# Patient Record
Sex: FEMALE | Race: WHITE | NOT HISPANIC OR LATINO | Employment: FULL TIME | URBAN - METROPOLITAN AREA
[De-identification: names, ages, dates, MRNs, and addresses within clinical notes are randomized per-mention and may not be internally consistent; named-entity substitution may affect disease eponyms.]

---

## 2023-03-01 ENCOUNTER — TELEPHONE (OUTPATIENT)
Dept: SURGERY | Facility: CLINIC | Age: 48
End: 2023-03-01
Payer: COMMERCIAL

## 2023-03-01 NOTE — TELEPHONE ENCOUNTER
Pt called back to speak about getting an appt with Dr. Zhang.  RN let pt know that an AV is scheduled for 3/21 at 1:00pm if she is available.  Pt confirmed and verbalized understanding to all. Pt was also told to work on getting her records together for review.  Pt stated that she already has copies of some at home.

## 2023-03-01 NOTE — TELEPHONE ENCOUNTER
Left pt a message on 3/1 at 11am with office number to give us a call back regarding BCIR procedure.  Pt did not have a MRN at this time.

## 2023-03-01 NOTE — TELEPHONE ENCOUNTER
Pt has a MRN now.  Called her back regarding her message. Left her a vmail with office number again.  Tentatively scheduled her an AV with Dr. Zhang after speaking with him.  Will wait until I speak with pt to confirm.

## 2023-03-21 ENCOUNTER — OFFICE VISIT (OUTPATIENT)
Dept: SURGERY | Facility: CLINIC | Age: 48
End: 2023-03-21
Payer: MEDICARE

## 2023-03-21 DIAGNOSIS — K91.850 CHRONIC ANTIBIOTIC-DEPENDENT ILEAL POUCHITIS: Primary | ICD-10-CM

## 2023-03-21 PROCEDURE — 99202 PR OFFICE/OUTPT VISIT, NEW, LEVL II, 15-29 MIN: ICD-10-PCS | Mod: 95,,, | Performed by: STUDENT IN AN ORGANIZED HEALTH CARE EDUCATION/TRAINING PROGRAM

## 2023-03-21 PROCEDURE — 99202 OFFICE O/P NEW SF 15 MIN: CPT | Mod: 95,,, | Performed by: STUDENT IN AN ORGANIZED HEALTH CARE EDUCATION/TRAINING PROGRAM

## 2023-03-21 NOTE — PROGRESS NOTES
Established Patient - Audio Only Telehealth Visit     The patient location is: [place]  The chief complaint leading to consultation is: Recurrent pouchitis  Visit type: Virtual visit with audio only (telephone)  Total time spent with patient: 15 min     The reason for the audio only service rather than synchronous audio and video virtual visit was related to technical difficulties or patient preference/necessity.     Each patient to whom I provide medical services by telemedicine is:  (1) informed of the relationship between the physician and patient and the respective role of any other health care provider with respect to management of the patient; and (2) notified that they may decline to receive medical services by telemedicine and may withdraw from such care at any time. Patient verbally consented to receive this service via voice-only telephone call.     HPI: Ms. Dumont is 47 years old underwent eventual BCIR in approximately 2015 - no revisions. Most significant issue is recurrent pouchitis - manifested as liquid stool, bleeding from intubations too frequently, and abdominal pain. She is on near constant Ciprofloxacin 500 mg BID - she was recently started on Budesonide (about 6 months) without significant benefit and so was started back on Ciprofloxacin recently. Ciprofloxacin seems to work best, has tried Flagyl. She has tried Probiotics, no significant improvement noted. She did have a pouchoscopy by her Gastroenterologist about 1 year ago - reportedly normal. She denies any significant incontinence, or difficulty intubating generally. She denies any fevers or chills normally some mild symptoms with pouchitis. She doesn't take NSAIDs frequently.    Past Medical History:   Diagnosis Date    Acute kidney injury     HTN (hypertension)     Neuropathy     Ulcerative colitis      Past Surgical History:   Procedure Laterality Date    CREATION OF KOCK POUCH CONTINENT ILEOSTOMY  2015    PROCTOCOLECTOMY, TOTAL  2005     End ileostomy     Family History   Problem Relation Age of Onset    Colon cancer Neg Hx     Crohn's disease Neg Hx     Ulcerative colitis Neg Hx      Social History     Tobacco Use    Smoking status: Former     Types: Cigarettes   Substance Use Topics    Alcohol use: Yes     Comment: Occassional    Drug use: Not Currently     Review of patient's allergies indicates:  Not on File    Assessment and plan:      Discussed possible etiologies of her symptoms - have recommended MRE and pouchoscopy with biopsies as first steps. She currently lives in [place] and will work with my team to schedule these at her convenience.       This service was not originating from a related E/M service provided within the previous 7 days nor will  to an E/M service or procedure within the next 24 hours or my soonest available appointment.  Prevailing standard of care was able to be met in this audio-only visit.        Jimi Zhang MD, FACS - Staff Surgeon  Department of Colon & Rectal Surgery  [facility]

## 2023-03-29 ENCOUNTER — TELEPHONE (OUTPATIENT)
Dept: SURGERY | Facility: CLINIC | Age: 48
End: 2023-03-29
Payer: COMMERCIAL

## 2023-04-04 ENCOUNTER — TELEPHONE (OUTPATIENT)
Dept: SURGERY | Facility: CLINIC | Age: 48
End: 2023-04-04
Payer: COMMERCIAL

## 2023-04-04 NOTE — TELEPHONE ENCOUNTER
Called pt back regarding her msg.  Pt needs to come to town to have a MRE and pouchoscopy completed.  Discuss dates and availability with pt.  Pt will come to town and have everything done on 5/9/23.  RN will mail a copy of pt's appts to her so that she will have the correct locations and addresses and times.  Pt verbalized understanding to all.